# Patient Record
Sex: FEMALE | Race: WHITE | NOT HISPANIC OR LATINO | URBAN - METROPOLITAN AREA
[De-identification: names, ages, dates, MRNs, and addresses within clinical notes are randomized per-mention and may not be internally consistent; named-entity substitution may affect disease eponyms.]

---

## 2017-03-02 ENCOUNTER — OUTPATIENT (OUTPATIENT)
Dept: OUTPATIENT SERVICES | Facility: HOSPITAL | Age: 67
LOS: 1 days | End: 2017-03-02
Payer: COMMERCIAL

## 2017-03-02 VITALS
DIASTOLIC BLOOD PRESSURE: 100 MMHG | TEMPERATURE: 98 F | OXYGEN SATURATION: 98 % | HEART RATE: 93 BPM | RESPIRATION RATE: 16 BRPM | SYSTOLIC BLOOD PRESSURE: 210 MMHG | HEIGHT: 66 IN | WEIGHT: 169.98 LBS

## 2017-03-02 DIAGNOSIS — I10 ESSENTIAL (PRIMARY) HYPERTENSION: ICD-10-CM

## 2017-03-02 DIAGNOSIS — S52.531A COLLES' FRACTURE OF RIGHT RADIUS, INITIAL ENCOUNTER FOR CLOSED FRACTURE: ICD-10-CM

## 2017-03-02 DIAGNOSIS — D49.59 NEOPLASM OF UNSPECIFIED BEHAVIOR OF OTHER GENITOURINARY ORGAN: Chronic | ICD-10-CM

## 2017-03-02 LAB
HCT VFR BLD CALC: 40.5 % — SIGNIFICANT CHANGE UP (ref 34.5–45)
HGB BLD-MCNC: 14.2 G/DL — SIGNIFICANT CHANGE UP (ref 11.5–15.5)
MCHC RBC-ENTMCNC: 33.6 PG — SIGNIFICANT CHANGE UP (ref 27–34)
MCHC RBC-ENTMCNC: 35.1 GM/DL — SIGNIFICANT CHANGE UP (ref 32–36)
MCV RBC AUTO: 95.7 FL — SIGNIFICANT CHANGE UP (ref 80–100)
PLATELET # BLD AUTO: 271 K/UL — SIGNIFICANT CHANGE UP (ref 150–400)
RBC # BLD: 4.23 M/UL — SIGNIFICANT CHANGE UP (ref 3.8–5.2)
RBC # FLD: 12.2 % — SIGNIFICANT CHANGE UP (ref 10.3–14.5)
WBC # BLD: 5.46 K/UL — SIGNIFICANT CHANGE UP (ref 3.8–10.5)
WBC # FLD AUTO: 5.46 K/UL — SIGNIFICANT CHANGE UP (ref 3.8–10.5)

## 2017-03-02 PROCEDURE — 80048 BASIC METABOLIC PNL TOTAL CA: CPT

## 2017-03-02 PROCEDURE — 85027 COMPLETE CBC AUTOMATED: CPT

## 2017-03-02 RX ORDER — CELECOXIB 200 MG/1
200 CAPSULE ORAL ONCE
Qty: 0 | Refills: 0 | Status: COMPLETED | OUTPATIENT
Start: 2017-03-08 | End: 2017-03-08

## 2017-03-02 RX ORDER — ACETAMINOPHEN 500 MG
975 TABLET ORAL ONCE
Qty: 0 | Refills: 0 | Status: COMPLETED | OUTPATIENT
Start: 2017-03-08 | End: 2017-03-08

## 2017-03-02 RX ORDER — SODIUM CHLORIDE 9 MG/ML
3 INJECTION INTRAMUSCULAR; INTRAVENOUS; SUBCUTANEOUS EVERY 8 HOURS
Qty: 0 | Refills: 0 | Status: DISCONTINUED | OUTPATIENT
Start: 2017-03-08 | End: 2017-03-23

## 2017-03-02 RX ORDER — LIDOCAINE HCL 20 MG/ML
0.2 VIAL (ML) INJECTION ONCE
Qty: 0 | Refills: 0 | Status: DISCONTINUED | OUTPATIENT
Start: 2017-03-08 | End: 2017-03-23
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
## 2017-03-02 NOTE — H&P PST ADULT - ATTENDING COMMENTS
The patient is admitted today for a ORIF of a distal radius fx   .  I have reviewed the procedure in detail again today with the patient.  The numerous risks, benefits, alternatives, possible complications and expectations are reviewed.  All questions have been thoroughly answered and the patient has verbalized understanding of all of the above and gives consent to proceed

## 2017-03-02 NOTE — H&P PST ADULT - PMH
Closed Colles' fracture of right radius, initial encounter    Essential hypertension  Stopped taking BP meds 10 years ago when she retired. Does not go for physicals.

## 2017-03-02 NOTE — H&P PST ADULT - NSANTHOSAYNRD_GEN_A_CORE
No. TEENA screening performed.  STOP BANG Legend: 0-2 = LOW Risk; 3-4 = INTERMEDIATE Risk; 5-8 = HIGH Risk

## 2017-03-02 NOTE — H&P PST ADULT - PROBLEM SELECTOR PLAN 2
Case discussed Dr Butler , Advised pt to go to ER for /100. Pt state she is going to find a PMD. Pt denies any headache, chest pain, SOB. Pt was on HTN meds 10 years ago and states she stopped because her BP was normal. Pt has not had a physical in years. Left message for Dr Sims OR booking per to reschedule case . Case discussed Dr Butler , Advised pt to go to ER for /100. Pt state she is going to find a PMD. Pt denies any headache, chest pain, SOB. Pt was on HTN meds 10 years ago and states she stopped because her BP was normal. Pt has not had a physical in years. Left message for Dr Sims OR booking per to reschedule case . Spoke to Dr Sims and he is aware pt needs M/C.

## 2017-03-03 LAB
ANION GAP SERPL CALC-SCNC: 16 MMOL/L — SIGNIFICANT CHANGE UP (ref 5–17)
BUN SERPL-MCNC: 10 MG/DL — SIGNIFICANT CHANGE UP (ref 7–23)
CALCIUM SERPL-MCNC: 9.8 MG/DL — SIGNIFICANT CHANGE UP (ref 8.4–10.5)
CHLORIDE SERPL-SCNC: 100 MMOL/L — SIGNIFICANT CHANGE UP (ref 96–108)
CO2 SERPL-SCNC: 24 MMOL/L — SIGNIFICANT CHANGE UP (ref 22–31)
CREAT SERPL-MCNC: 0.69 MG/DL — SIGNIFICANT CHANGE UP (ref 0.5–1.3)
GLUCOSE SERPL-MCNC: 103 MG/DL — HIGH (ref 70–99)
POTASSIUM SERPL-MCNC: 4 MMOL/L — SIGNIFICANT CHANGE UP (ref 3.5–5.3)
POTASSIUM SERPL-SCNC: 4 MMOL/L — SIGNIFICANT CHANGE UP (ref 3.5–5.3)
SODIUM SERPL-SCNC: 140 MMOL/L — SIGNIFICANT CHANGE UP (ref 135–145)

## 2017-03-08 ENCOUNTER — OUTPATIENT (OUTPATIENT)
Dept: OUTPATIENT SERVICES | Facility: HOSPITAL | Age: 67
LOS: 1 days | End: 2017-03-08
Payer: COMMERCIAL

## 2017-03-08 ENCOUNTER — TRANSCRIPTION ENCOUNTER (OUTPATIENT)
Age: 67
End: 2017-03-08

## 2017-03-08 VITALS
SYSTOLIC BLOOD PRESSURE: 151 MMHG | DIASTOLIC BLOOD PRESSURE: 83 MMHG | OXYGEN SATURATION: 98 % | HEART RATE: 81 BPM | RESPIRATION RATE: 18 BRPM

## 2017-03-08 VITALS
OXYGEN SATURATION: 99 % | HEIGHT: 66 IN | TEMPERATURE: 98 F | SYSTOLIC BLOOD PRESSURE: 210 MMHG | HEART RATE: 93 BPM | DIASTOLIC BLOOD PRESSURE: 100 MMHG | RESPIRATION RATE: 16 BRPM | WEIGHT: 169.98 LBS

## 2017-03-08 DIAGNOSIS — D49.59 NEOPLASM OF UNSPECIFIED BEHAVIOR OF OTHER GENITOURINARY ORGAN: Chronic | ICD-10-CM

## 2017-03-08 DIAGNOSIS — S52.531A COLLES' FRACTURE OF RIGHT RADIUS, INITIAL ENCOUNTER FOR CLOSED FRACTURE: ICD-10-CM

## 2017-03-08 PROCEDURE — C1713: CPT

## 2017-03-08 PROCEDURE — 25607 OPTX DST RD XARTC FX/EPI SEP: CPT | Mod: RT

## 2017-03-08 RX ORDER — IBUPROFEN 200 MG
1 TABLET ORAL
Qty: 0 | Refills: 0 | COMMUNITY

## 2017-03-08 RX ORDER — OXYCODONE HYDROCHLORIDE 5 MG/1
5 TABLET ORAL ONCE
Qty: 0 | Refills: 0 | Status: DISCONTINUED | OUTPATIENT
Start: 2017-03-08 | End: 2017-03-08

## 2017-03-08 RX ORDER — CEFAZOLIN SODIUM 1 G
2000 VIAL (EA) INJECTION ONCE
Qty: 0 | Refills: 0 | Status: COMPLETED | OUTPATIENT
Start: 2017-03-08 | End: 2017-03-08

## 2017-03-08 RX ORDER — NIFEDIPINE 30 MG
1 TABLET, EXTENDED RELEASE 24 HR ORAL
Qty: 0 | Refills: 0 | COMMUNITY

## 2017-03-08 RX ORDER — HYDROMORPHONE HYDROCHLORIDE 2 MG/ML
0.2 INJECTION INTRAMUSCULAR; INTRAVENOUS; SUBCUTANEOUS
Qty: 0 | Refills: 0 | Status: DISCONTINUED | OUTPATIENT
Start: 2017-03-08 | End: 2017-03-08

## 2017-03-08 RX ORDER — ONDANSETRON 8 MG/1
4 TABLET, FILM COATED ORAL ONCE
Qty: 0 | Refills: 0 | Status: DISCONTINUED | OUTPATIENT
Start: 2017-03-08 | End: 2017-03-23

## 2017-03-08 RX ORDER — FAMOTIDINE 10 MG/ML
1 INJECTION INTRAVENOUS
Qty: 0 | Refills: 0 | COMMUNITY

## 2017-03-08 RX ORDER — FAMOTIDINE 10 MG/ML
20 INJECTION INTRAVENOUS ONCE
Qty: 0 | Refills: 0 | Status: COMPLETED | OUTPATIENT
Start: 2017-03-08 | End: 2017-03-08

## 2017-03-08 RX ORDER — SODIUM CHLORIDE 9 MG/ML
1000 INJECTION INTRAMUSCULAR; INTRAVENOUS; SUBCUTANEOUS
Qty: 0 | Refills: 0 | Status: DISCONTINUED | OUTPATIENT
Start: 2017-03-08 | End: 2017-03-23

## 2017-03-08 RX ORDER — CELECOXIB 200 MG/1
200 CAPSULE ORAL ONCE
Qty: 0 | Refills: 0 | Status: DISCONTINUED | OUTPATIENT
Start: 2017-03-08 | End: 2017-03-23

## 2017-03-08 RX ORDER — OXYCODONE HYDROCHLORIDE 5 MG/1
1 TABLET ORAL
Qty: 16 | Refills: 0 | OUTPATIENT
Start: 2017-03-08 | End: 2017-03-12

## 2017-03-08 RX ORDER — OXYCODONE HYDROCHLORIDE 5 MG/1
10 TABLET ORAL ONCE
Qty: 0 | Refills: 0 | Status: DISCONTINUED | OUTPATIENT
Start: 2017-03-08 | End: 2017-03-08

## 2017-03-08 RX ADMIN — CELECOXIB 200 MILLIGRAM(S): 200 CAPSULE ORAL at 11:02

## 2017-03-08 RX ADMIN — Medication 975 MILLIGRAM(S): at 11:02

## 2017-03-08 RX ADMIN — FAMOTIDINE 20 MILLIGRAM(S): 10 INJECTION INTRAVENOUS at 11:44

## 2017-03-08 NOTE — ASU DISCHARGE PLAN (ADULT/PEDIATRIC). - NOTIFY
Swelling that continues/Bleeding that does not stop/Fever greater than 101/Numbness, color, or temperature change to extremity/Pain not relieved by Medications

## 2017-03-08 NOTE — ASU DISCHARGE PLAN (ADULT/PEDIATRIC). - MEDICATION SUMMARY - MEDICATIONS TO TAKE
I will START or STAY ON the medications listed below when I get home from the hospital:    oxyCODONE 5 mg oral tablet  -- 1 tab(s) by mouth every 6 hours MDD:4 per day  -- Caution federal law prohibits the transfer of this drug to any person other  than the person for whom it was prescribed.  It is very important that you take or use this exactly as directed.  Do not skip doses or discontinue unless directed by your doctor.  May cause drowsiness.  Alcohol may intensify this effect.  Use care when operating dangerous machinery.  This prescription cannot be refilled.  Using more of this medication than prescribed may cause serious breathing problems.    -- Indication: For Pain    Motrin  mg oral capsule  -- 1 cap(s) by mouth every 6 hours, As Needed  -- Indication: For Pain

## 2017-03-08 NOTE — ASU DISCHARGE PLAN (ADULT/PEDIATRIC). - INSTRUCTIONS
You may resume a regular diet You may resume a regular diet and regular activities. Please do not participate in heavy lifting or strenuous exercise. Do not drive while taking pain medication.    Please call Dr. Sims if you experience bleeding that will not stop, pain not relieved by pain medication or change in color to the left hand.

## 2017-03-08 NOTE — ASU DISCHARGE PLAN (ADULT/PEDIATRIC). - FOLLOWUP APPOINTMENT CLINIC/PHYSICIAN
Please follow up with Dr. Sims within 1-2 weeks. You may call [phone] to schedule an appointment.

## 2017-03-08 NOTE — ASU DISCHARGE PLAN (ADULT/PEDIATRIC). - ITEMS TO FOLLOWUP WITH YOUR PHYSICIAN'S
Please follow up with Dr. Sims within 1-2 weeks. You may call [phone] to schedule an appointment.    You may resume a regular diet and regular activities. Please do not participate in heavy lifting or strenuous exercise. Do not drive while taking pain medication.    Please call Dr. Smis if you experience bleeding that will not stop, pain not relieved by pain medication or change in color to the left hand.

## 2017-06-19 ENCOUNTER — TRANSCRIPTION ENCOUNTER (OUTPATIENT)
Age: 67
End: 2017-06-19

## 2017-06-19 PROBLEM — Z00.00 ENCOUNTER FOR PREVENTIVE HEALTH EXAMINATION: Status: ACTIVE | Noted: 2017-06-19

## 2017-06-27 ENCOUNTER — APPOINTMENT (OUTPATIENT)
Dept: RADIOLOGY | Facility: CLINIC | Age: 67
End: 2017-06-27

## 2017-06-27 ENCOUNTER — OUTPATIENT (OUTPATIENT)
Dept: OUTPATIENT SERVICES | Facility: HOSPITAL | Age: 67
LOS: 1 days | End: 2017-06-27
Payer: COMMERCIAL

## 2017-06-27 ENCOUNTER — APPOINTMENT (OUTPATIENT)
Dept: MAMMOGRAPHY | Facility: CLINIC | Age: 67
End: 2017-06-27

## 2017-06-27 DIAGNOSIS — D49.59 NEOPLASM OF UNSPECIFIED BEHAVIOR OF OTHER GENITOURINARY ORGAN: Chronic | ICD-10-CM

## 2017-06-27 DIAGNOSIS — Z00.8 ENCOUNTER FOR OTHER GENERAL EXAMINATION: ICD-10-CM

## 2017-06-27 PROCEDURE — 77067 SCR MAMMO BI INCL CAD: CPT

## 2017-06-27 PROCEDURE — 77063 BREAST TOMOSYNTHESIS BI: CPT

## 2017-06-27 PROCEDURE — 77080 DXA BONE DENSITY AXIAL: CPT

## 2018-09-22 PROBLEM — I10 ESSENTIAL (PRIMARY) HYPERTENSION: Chronic | Status: ACTIVE | Noted: 2017-03-02

## 2018-09-22 PROBLEM — S52.531A COLLES' FRACTURE OF RIGHT RADIUS, INITIAL ENCOUNTER FOR CLOSED FRACTURE: Chronic | Status: ACTIVE | Noted: 2017-03-02

## 2018-10-02 ENCOUNTER — APPOINTMENT (OUTPATIENT)
Dept: MAMMOGRAPHY | Facility: CLINIC | Age: 68
End: 2018-10-02
Payer: MEDICARE

## 2018-10-02 ENCOUNTER — OUTPATIENT (OUTPATIENT)
Dept: OUTPATIENT SERVICES | Facility: HOSPITAL | Age: 68
LOS: 1 days | End: 2018-10-02
Payer: MEDICARE

## 2018-10-02 DIAGNOSIS — D49.59 NEOPLASM OF UNSPECIFIED BEHAVIOR OF OTHER GENITOURINARY ORGAN: Chronic | ICD-10-CM

## 2018-10-02 DIAGNOSIS — Z00.8 ENCOUNTER FOR OTHER GENERAL EXAMINATION: ICD-10-CM

## 2018-10-02 PROCEDURE — 77067 SCR MAMMO BI INCL CAD: CPT

## 2018-10-02 PROCEDURE — 77063 BREAST TOMOSYNTHESIS BI: CPT | Mod: 26

## 2018-10-02 PROCEDURE — 77067 SCR MAMMO BI INCL CAD: CPT | Mod: 26

## 2018-10-02 PROCEDURE — 77063 BREAST TOMOSYNTHESIS BI: CPT

## 2018-11-12 ENCOUNTER — RESULT REVIEW (OUTPATIENT)
Age: 68
End: 2018-11-12

## 2019-10-04 ENCOUNTER — OUTPATIENT (OUTPATIENT)
Dept: OUTPATIENT SERVICES | Facility: HOSPITAL | Age: 69
LOS: 1 days | End: 2019-10-04
Payer: MEDICARE

## 2019-10-04 ENCOUNTER — APPOINTMENT (OUTPATIENT)
Dept: MAMMOGRAPHY | Facility: CLINIC | Age: 69
End: 2019-10-04
Payer: MEDICARE

## 2019-10-04 ENCOUNTER — APPOINTMENT (OUTPATIENT)
Dept: RADIOLOGY | Facility: CLINIC | Age: 69
End: 2019-10-04
Payer: MEDICARE

## 2019-10-04 DIAGNOSIS — D49.59 NEOPLASM OF UNSPECIFIED BEHAVIOR OF OTHER GENITOURINARY ORGAN: Chronic | ICD-10-CM

## 2019-10-04 DIAGNOSIS — Z00.8 ENCOUNTER FOR OTHER GENERAL EXAMINATION: ICD-10-CM

## 2019-10-04 PROCEDURE — 77067 SCR MAMMO BI INCL CAD: CPT

## 2019-10-04 PROCEDURE — 77063 BREAST TOMOSYNTHESIS BI: CPT

## 2019-10-04 PROCEDURE — 77067 SCR MAMMO BI INCL CAD: CPT | Mod: 26

## 2019-10-04 PROCEDURE — 77080 DXA BONE DENSITY AXIAL: CPT

## 2019-10-04 PROCEDURE — 77080 DXA BONE DENSITY AXIAL: CPT | Mod: 26

## 2019-10-04 PROCEDURE — 77063 BREAST TOMOSYNTHESIS BI: CPT | Mod: 26

## 2019-10-24 ENCOUNTER — OUTPATIENT (OUTPATIENT)
Dept: OUTPATIENT SERVICES | Facility: HOSPITAL | Age: 69
LOS: 1 days | End: 2019-10-24
Payer: MEDICARE

## 2019-10-24 ENCOUNTER — APPOINTMENT (OUTPATIENT)
Dept: ULTRASOUND IMAGING | Facility: CLINIC | Age: 69
End: 2019-10-24
Payer: MEDICARE

## 2019-10-24 DIAGNOSIS — Z00.8 ENCOUNTER FOR OTHER GENERAL EXAMINATION: ICD-10-CM

## 2019-10-24 DIAGNOSIS — D49.59 NEOPLASM OF UNSPECIFIED BEHAVIOR OF OTHER GENITOURINARY ORGAN: Chronic | ICD-10-CM

## 2019-10-24 PROCEDURE — 76642 ULTRASOUND BREAST LIMITED: CPT

## 2019-10-24 PROCEDURE — 76642 ULTRASOUND BREAST LIMITED: CPT | Mod: 26,LT

## 2020-10-20 ENCOUNTER — OUTPATIENT (OUTPATIENT)
Dept: OUTPATIENT SERVICES | Facility: HOSPITAL | Age: 70
LOS: 1 days | End: 2020-10-20
Payer: MEDICARE

## 2020-10-20 ENCOUNTER — APPOINTMENT (OUTPATIENT)
Dept: MAMMOGRAPHY | Facility: CLINIC | Age: 70
End: 2020-10-20
Payer: MEDICARE

## 2020-10-20 ENCOUNTER — APPOINTMENT (OUTPATIENT)
Dept: RADIOLOGY | Facility: CLINIC | Age: 70
End: 2020-10-20
Payer: MEDICARE

## 2020-10-20 DIAGNOSIS — D49.59 NEOPLASM OF UNSPECIFIED BEHAVIOR OF OTHER GENITOURINARY ORGAN: Chronic | ICD-10-CM

## 2020-10-20 DIAGNOSIS — Z00.8 ENCOUNTER FOR OTHER GENERAL EXAMINATION: ICD-10-CM

## 2020-10-20 PROCEDURE — 77063 BREAST TOMOSYNTHESIS BI: CPT | Mod: 26

## 2020-10-20 PROCEDURE — 77067 SCR MAMMO BI INCL CAD: CPT | Mod: 26

## 2020-10-20 PROCEDURE — 77063 BREAST TOMOSYNTHESIS BI: CPT

## 2020-10-20 PROCEDURE — 73564 X-RAY EXAM KNEE 4 OR MORE: CPT

## 2020-10-20 PROCEDURE — 77067 SCR MAMMO BI INCL CAD: CPT

## 2020-10-20 PROCEDURE — 73564 X-RAY EXAM KNEE 4 OR MORE: CPT | Mod: 26,50

## 2020-10-22 ENCOUNTER — TRANSCRIPTION ENCOUNTER (OUTPATIENT)
Age: 70
End: 2020-10-22

## 2020-11-04 ENCOUNTER — APPOINTMENT (OUTPATIENT)
Dept: ULTRASOUND IMAGING | Facility: CLINIC | Age: 70
End: 2020-11-04
Payer: MEDICARE

## 2020-11-04 ENCOUNTER — APPOINTMENT (OUTPATIENT)
Dept: MAMMOGRAPHY | Facility: CLINIC | Age: 70
End: 2020-11-04
Payer: MEDICARE

## 2020-11-04 ENCOUNTER — OUTPATIENT (OUTPATIENT)
Dept: OUTPATIENT SERVICES | Facility: HOSPITAL | Age: 70
LOS: 1 days | End: 2020-11-04
Payer: MEDICARE

## 2020-11-04 DIAGNOSIS — Z00.8 ENCOUNTER FOR OTHER GENERAL EXAMINATION: ICD-10-CM

## 2020-11-04 DIAGNOSIS — D49.59 NEOPLASM OF UNSPECIFIED BEHAVIOR OF OTHER GENITOURINARY ORGAN: Chronic | ICD-10-CM

## 2020-11-04 PROCEDURE — 77065 DX MAMMO INCL CAD UNI: CPT | Mod: 26,RT

## 2020-11-04 PROCEDURE — 76642 ULTRASOUND BREAST LIMITED: CPT | Mod: 26,RT

## 2020-11-04 PROCEDURE — 76642 ULTRASOUND BREAST LIMITED: CPT

## 2020-11-04 PROCEDURE — 77065 DX MAMMO INCL CAD UNI: CPT

## 2020-11-04 PROCEDURE — G0279: CPT | Mod: 26

## 2020-11-04 PROCEDURE — G0279: CPT

## 2021-10-26 ENCOUNTER — OUTPATIENT (OUTPATIENT)
Dept: OUTPATIENT SERVICES | Facility: HOSPITAL | Age: 71
LOS: 1 days | End: 2021-10-26
Payer: MEDICARE

## 2021-10-26 ENCOUNTER — APPOINTMENT (OUTPATIENT)
Dept: MAMMOGRAPHY | Facility: CLINIC | Age: 71
End: 2021-10-26
Payer: MEDICARE

## 2021-10-26 ENCOUNTER — APPOINTMENT (OUTPATIENT)
Dept: ULTRASOUND IMAGING | Facility: CLINIC | Age: 71
End: 2021-10-26
Payer: MEDICARE

## 2021-10-26 DIAGNOSIS — Z00.8 ENCOUNTER FOR OTHER GENERAL EXAMINATION: ICD-10-CM

## 2021-10-26 DIAGNOSIS — D49.59 NEOPLASM OF UNSPECIFIED BEHAVIOR OF OTHER GENITOURINARY ORGAN: Chronic | ICD-10-CM

## 2021-10-26 PROCEDURE — 77067 SCR MAMMO BI INCL CAD: CPT | Mod: 26

## 2021-10-26 PROCEDURE — 77063 BREAST TOMOSYNTHESIS BI: CPT | Mod: 26

## 2021-10-26 PROCEDURE — 77067 SCR MAMMO BI INCL CAD: CPT

## 2021-10-26 PROCEDURE — 77063 BREAST TOMOSYNTHESIS BI: CPT

## 2022-03-29 ENCOUNTER — APPOINTMENT (OUTPATIENT)
Dept: OTOLARYNGOLOGY | Facility: CLINIC | Age: 72
End: 2022-03-29
Payer: MEDICARE

## 2022-03-29 VITALS
WEIGHT: 159 LBS | HEIGHT: 65 IN | HEART RATE: 76 BPM | DIASTOLIC BLOOD PRESSURE: 77 MMHG | SYSTOLIC BLOOD PRESSURE: 122 MMHG | BODY MASS INDEX: 26.49 KG/M2

## 2022-03-29 DIAGNOSIS — Z86.79 PERSONAL HISTORY OF OTHER DISEASES OF THE CIRCULATORY SYSTEM: ICD-10-CM

## 2022-03-29 DIAGNOSIS — J34.2 DEVIATED NASAL SEPTUM: ICD-10-CM

## 2022-03-29 DIAGNOSIS — H61.21 IMPACTED CERUMEN, RIGHT EAR: ICD-10-CM

## 2022-03-29 DIAGNOSIS — H92.01 OTALGIA, RIGHT EAR: ICD-10-CM

## 2022-03-29 DIAGNOSIS — Z82.2 FAMILY HISTORY OF DEAFNESS AND HEARING LOSS: ICD-10-CM

## 2022-03-29 DIAGNOSIS — H60.501 UNSPECIFIED ACUTE NONINFECTIVE OTITIS EXTERNA, RIGHT EAR: ICD-10-CM

## 2022-03-29 DIAGNOSIS — Z80.9 FAMILY HISTORY OF MALIGNANT NEOPLASM, UNSPECIFIED: ICD-10-CM

## 2022-03-29 DIAGNOSIS — Z87.891 PERSONAL HISTORY OF NICOTINE DEPENDENCE: ICD-10-CM

## 2022-03-29 DIAGNOSIS — Z78.9 OTHER SPECIFIED HEALTH STATUS: ICD-10-CM

## 2022-03-29 PROCEDURE — 69210 REMOVE IMPACTED EAR WAX UNI: CPT

## 2022-03-29 PROCEDURE — 99203 OFFICE O/P NEW LOW 30 MIN: CPT

## 2022-03-29 RX ORDER — LOSARTAN POTASSIUM 100 MG/1
100 TABLET, FILM COATED ORAL
Refills: 0 | Status: ACTIVE | COMMUNITY

## 2022-03-29 RX ORDER — AMLODIPINE BESYLATE 5 MG/1
5 TABLET ORAL
Refills: 0 | Status: ACTIVE | COMMUNITY

## 2022-03-29 RX ORDER — HYDROCHLOROTHIAZIDE 12.5 MG/1
12.5 CAPSULE ORAL
Refills: 0 | Status: ACTIVE | COMMUNITY

## 2022-03-29 NOTE — HISTORY OF PRESENT ILLNESS
[de-identified] : Pt presents today c/o soreness of right ear and clogged sensation in right ear. Denies use of q-tips. Pt reports that she has noticed muffled hearing over the past couple of weeks, since the clogged sensation began. Pt states that she used peroxide which made sx worse. Denies tinnitus. \par

## 2022-03-29 NOTE — PHYSICAL EXAM
[Hearing Leigh Test (Tuning Fork On Forehead)] : no lateralization of tone [] : septum deviated to the right [Midline] : trachea located in midline position [Normal] : no masses and lesions seen, face is symmetric [FreeTextEntry8] : collapsing ear canal. Cerumen impaction removed via curettage and alligator forceps. Pocket of pus emptied out.  [FreeTextEntry9] : Cerumen impaction removed via curettage. narrow ear canals. [FreeTextEntry1] : deviated septum right- not obstructive [de-identified] : class 1 [de-identified] : uvula is normal [FreeTextEntry2] : Sinuses nontender to percussion.

## 2022-03-29 NOTE — ASSESSMENT
[FreeTextEntry1] : Reviewed and reconciled medications, allergies, PMHx, PSHx, SocHx, FMHx. \par \par right ear clogged sensation- resolved with cerumen removal\par right ear soreness\par muffled hearing- resolved with cerumen removal\par b/l cerumen impaction \par pocket of pus in right external ear canal\par \par Plan:\par Cerumen removed. Ofloxacin drops - 4 drops BID in right ear, for 5 days. FU 6 months.

## 2022-03-29 NOTE — REVIEW OF SYSTEMS
[Seasonal Allergies] : seasonal allergies [Ear Pain] : ear pain [Ear Itch] : ear itch [Hearing Loss] : hearing loss [Nasal Congestion] : nasal congestion [Eyes Itch] : itching of the eyes [Heartburn] : heartburn [Joint Pain] : joint pain [Easy Bruising] : a tendency for easy bruising [Negative] : Endocrine

## 2022-03-29 NOTE — ADDENDUM
[FreeTextEntry1] : Documented by Earl Pedroza acting as scribe for Dr. Diallo on 03/29/2022.\par \par All Medical record entries made by the Scribe were at my, Dr. Diallo, direction and personally dictated by me on 03/29/2022. I have reviewed the chart and agree that the record accurately reflects my personal performance of the history, physical exam, assessment and plan. I have also personally directed, reviewed, and agreed with the discharge instructions.

## 2022-03-29 NOTE — CONSULT LETTER
[Dear  ___] : Dear  [unfilled], [Consult Letter:] : I had the pleasure of evaluating your patient, [unfilled]. [Please see my note below.] : Please see my note below. [Consult Closing:] : Thank you very much for allowing me to participate in the care of this patient.  If you have any questions, please do not hesitate to contact me. [Sincerely,] : Sincerely, [FreeTextEntry3] : Zack Diallo MD FACS

## 2022-03-29 NOTE — REASON FOR VISIT
[Initial Consultation] : an initial consultation for [FreeTextEntry2] : Ear clogging and pain right ear

## 2022-10-27 ENCOUNTER — OUTPATIENT (OUTPATIENT)
Dept: OUTPATIENT SERVICES | Facility: HOSPITAL | Age: 72
LOS: 1 days | End: 2022-10-27
Payer: MEDICARE

## 2022-10-27 ENCOUNTER — APPOINTMENT (OUTPATIENT)
Dept: MAMMOGRAPHY | Facility: CLINIC | Age: 72
End: 2022-10-27

## 2022-10-27 ENCOUNTER — APPOINTMENT (OUTPATIENT)
Dept: RADIOLOGY | Facility: CLINIC | Age: 72
End: 2022-10-27

## 2022-10-27 DIAGNOSIS — D49.59 NEOPLASM OF UNSPECIFIED BEHAVIOR OF OTHER GENITOURINARY ORGAN: Chronic | ICD-10-CM

## 2022-10-27 DIAGNOSIS — Z00.8 ENCOUNTER FOR OTHER GENERAL EXAMINATION: ICD-10-CM

## 2022-10-27 PROCEDURE — 77080 DXA BONE DENSITY AXIAL: CPT | Mod: 26

## 2022-10-27 PROCEDURE — 77063 BREAST TOMOSYNTHESIS BI: CPT | Mod: 26

## 2022-10-27 PROCEDURE — 77080 DXA BONE DENSITY AXIAL: CPT

## 2022-10-27 PROCEDURE — 77067 SCR MAMMO BI INCL CAD: CPT

## 2022-10-27 PROCEDURE — 77067 SCR MAMMO BI INCL CAD: CPT | Mod: 26

## 2022-10-27 PROCEDURE — 77063 BREAST TOMOSYNTHESIS BI: CPT

## 2023-01-28 ENCOUNTER — NON-APPOINTMENT (OUTPATIENT)
Age: 73
End: 2023-01-28

## 2023-01-30 ENCOUNTER — APPOINTMENT (OUTPATIENT)
Dept: OTOLARYNGOLOGY | Facility: CLINIC | Age: 73
End: 2023-01-30
Payer: MEDICARE

## 2023-01-30 VITALS
WEIGHT: 156 LBS | BODY MASS INDEX: 25.99 KG/M2 | HEIGHT: 65 IN | DIASTOLIC BLOOD PRESSURE: 80 MMHG | SYSTOLIC BLOOD PRESSURE: 155 MMHG | HEART RATE: 71 BPM

## 2023-01-30 PROCEDURE — 69210 REMOVE IMPACTED EAR WAX UNI: CPT

## 2023-01-30 PROCEDURE — 99213 OFFICE O/P EST LOW 20 MIN: CPT | Mod: 25

## 2023-01-30 RX ORDER — OFLOXACIN OTIC 3 MG/ML
0.3 SOLUTION AURICULAR (OTIC) TWICE DAILY
Qty: 1 | Refills: 0 | Status: DISCONTINUED | COMMUNITY
Start: 2022-03-29 | End: 2023-01-30

## 2023-01-30 RX ORDER — ALENDRONATE SODIUM 70 MG/1
70 TABLET ORAL
Refills: 0 | Status: DISCONTINUED | COMMUNITY
End: 2023-01-30

## 2023-01-30 NOTE — PHYSICAL EXAM
[de-identified] : CI AU [Normal] : mucosa is normal [Midline] : trachea located in midline position

## 2023-01-30 NOTE — HISTORY OF PRESENT ILLNESS
[de-identified] : 72 yr old female c/o clogged ears\par -otalgia, dizzy\par occ tinnitus\par \par +hx OE\par -noise exp, head trauma, FH\par

## 2023-06-30 ENCOUNTER — OUTPATIENT (OUTPATIENT)
Dept: OUTPATIENT SERVICES | Facility: HOSPITAL | Age: 73
LOS: 1 days | Discharge: HOME | End: 2023-06-30

## 2023-06-30 DIAGNOSIS — D49.59 NEOPLASM OF UNSPECIFIED BEHAVIOR OF OTHER GENITOURINARY ORGAN: Chronic | ICD-10-CM

## 2023-07-02 DIAGNOSIS — R79.89 OTHER SPECIFIED ABNORMAL FINDINGS OF BLOOD CHEMISTRY: ICD-10-CM

## 2023-07-03 ENCOUNTER — APPOINTMENT (OUTPATIENT)
Dept: HEMATOLOGY ONCOLOGY | Facility: CLINIC | Age: 73
End: 2023-07-03
Payer: MEDICARE

## 2023-07-03 VITALS
HEART RATE: 72 BPM | HEIGHT: 65 IN | WEIGHT: 148.37 LBS | OXYGEN SATURATION: 97 % | DIASTOLIC BLOOD PRESSURE: 83 MMHG | BODY MASS INDEX: 24.72 KG/M2 | TEMPERATURE: 98.3 F | SYSTOLIC BLOOD PRESSURE: 153 MMHG

## 2023-07-03 DIAGNOSIS — E83.110 HEREDITARY HEMOCHROMATOSIS: ICD-10-CM

## 2023-07-03 PROCEDURE — 99203 OFFICE O/P NEW LOW 30 MIN: CPT

## 2023-07-03 NOTE — ADDENDUM
[FreeTextEntry1] : Documented by Kati Moreno acting as scribe for Dr. Kilgore on 07/03/2023 \par \par All Medical record entries made by the Scribe were at my, Dr. Kilgore's, direction and personally dictated by me on 07/03/2023 . I have reviewed the chart and agree that the record accurately reflects my personal performance of the history, physical exam, assessment and plan. I have also personally directed, reviewed, and agreed with the discharge instructions.\par

## 2023-07-03 NOTE — ASSESSMENT
[FreeTextEntry1] : 72 year old F with PMHX presents for initial evaluation for hemochromatosis referred by Dr. 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 Advised blood donation to achieve ferritin goals. \par -Testing for asymptomatic relatives\par -F/u as needed

## 2023-07-03 NOTE — HISTORY OF PRESENT ILLNESS
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 \par 5/10/23: WBC 3.68, HGB 13.9, HCT 41.5, .0, , AST 17. ALT 11, ALK phos 80, , Ferritin 335,  iron 170, TIBC 292, %sat 58\par \par Fmhx: Father-Colon cancer\par \par Patient presents for an initial consultation\par Mother and father born in [place]\par Patient reports she was iron deficient during pregnancy and placed on oral iron which caused constipation.

## 2023-07-03 NOTE — REVIEW OF SYSTEMS
[Diarrhea: Grade 0] : Diarrhea: Grade 0 [Negative] : Allergic/Immunologic [FreeTextEntry2] : negative except as indicated in interval history

## 2023-10-28 ENCOUNTER — APPOINTMENT (OUTPATIENT)
Dept: MAMMOGRAPHY | Facility: CLINIC | Age: 73
End: 2023-10-28
Payer: MEDICARE

## 2023-10-28 PROCEDURE — 77067 SCR MAMMO BI INCL CAD: CPT

## 2023-10-28 PROCEDURE — 77063 BREAST TOMOSYNTHESIS BI: CPT

## 2024-02-27 ENCOUNTER — APPOINTMENT (OUTPATIENT)
Dept: OTOLARYNGOLOGY | Facility: CLINIC | Age: 74
End: 2024-02-27
Payer: MEDICARE

## 2024-02-27 VITALS
HEIGHT: 64 IN | SYSTOLIC BLOOD PRESSURE: 153 MMHG | WEIGHT: 150 LBS | BODY MASS INDEX: 25.61 KG/M2 | DIASTOLIC BLOOD PRESSURE: 72 MMHG | HEART RATE: 73 BPM

## 2024-02-27 DIAGNOSIS — H61.23 IMPACTED CERUMEN, BILATERAL: ICD-10-CM

## 2024-02-27 DIAGNOSIS — H93.293 OTHER ABNORMAL AUDITORY PERCEPTIONS, BILATERAL: ICD-10-CM

## 2024-02-27 PROCEDURE — 99213 OFFICE O/P EST LOW 20 MIN: CPT | Mod: 25

## 2024-02-27 PROCEDURE — 69210 REMOVE IMPACTED EAR WAX UNI: CPT

## 2024-02-27 NOTE — HISTORY OF PRESENT ILLNESS
[de-identified] : 73 yr old female c/o clogged ears -otalgia, dizzy occ tinnitus  +hx OE -noise exp, head trauma, FH

## 2024-02-27 NOTE — PHYSICAL EXAM
[de-identified] : CI AU [Normal] : mucosa is normal [Midline] : trachea located in midline position

## 2024-10-29 ENCOUNTER — APPOINTMENT (OUTPATIENT)
Dept: MAMMOGRAPHY | Facility: CLINIC | Age: 74
End: 2024-10-29
Payer: MEDICARE

## 2024-10-29 ENCOUNTER — APPOINTMENT (OUTPATIENT)
Dept: RADIOLOGY | Facility: CLINIC | Age: 74
End: 2024-10-29
Payer: MEDICARE

## 2024-10-29 PROCEDURE — 77067 SCR MAMMO BI INCL CAD: CPT

## 2024-10-29 PROCEDURE — 77063 BREAST TOMOSYNTHESIS BI: CPT

## 2024-10-29 PROCEDURE — 77080 DXA BONE DENSITY AXIAL: CPT

## 2025-01-20 ENCOUNTER — NON-APPOINTMENT (OUTPATIENT)
Age: 75
End: 2025-01-20

## 2025-01-20 ENCOUNTER — APPOINTMENT (OUTPATIENT)
Dept: OTOLARYNGOLOGY | Facility: CLINIC | Age: 75
End: 2025-01-20
Payer: MEDICARE

## 2025-01-20 VITALS
SYSTOLIC BLOOD PRESSURE: 133 MMHG | HEIGHT: 64 IN | DIASTOLIC BLOOD PRESSURE: 77 MMHG | HEART RATE: 66 BPM | BODY MASS INDEX: 25.78 KG/M2 | WEIGHT: 151 LBS

## 2025-01-20 DIAGNOSIS — H93.13 TINNITUS, BILATERAL: ICD-10-CM

## 2025-01-20 DIAGNOSIS — H90.3 SENSORINEURAL HEARING LOSS, BILATERAL: ICD-10-CM

## 2025-01-20 DIAGNOSIS — H61.23 IMPACTED CERUMEN, BILATERAL: ICD-10-CM

## 2025-01-20 PROCEDURE — G0268 REMOVAL OF IMPACTED WAX MD: CPT

## 2025-01-20 PROCEDURE — 99213 OFFICE O/P EST LOW 20 MIN: CPT | Mod: 25

## 2025-01-20 PROCEDURE — 92557 COMPREHENSIVE HEARING TEST: CPT

## 2025-01-20 PROCEDURE — 92567 TYMPANOMETRY: CPT
